# Patient Record
Sex: MALE | Race: WHITE | Employment: OTHER | URBAN - METROPOLITAN AREA
[De-identification: names, ages, dates, MRNs, and addresses within clinical notes are randomized per-mention and may not be internally consistent; named-entity substitution may affect disease eponyms.]

---

## 2018-08-21 ENCOUNTER — HOSPITAL ENCOUNTER (OUTPATIENT)
Dept: ULTRASOUND IMAGING | Facility: HOSPITAL | Age: 73
Discharge: HOME | End: 2018-08-21
Attending: FAMILY MEDICINE
Payer: MEDICARE

## 2018-08-21 DIAGNOSIS — I10 HYPERTENSION: ICD-10-CM

## 2018-08-21 DIAGNOSIS — E78.5 HYPERLIPEMIA: ICD-10-CM

## 2018-08-21 DIAGNOSIS — R42 DIZZINESS: ICD-10-CM

## 2018-08-21 PROCEDURE — 93880 EXTRACRANIAL BILAT STUDY: CPT | Performed by: FAMILY MEDICINE

## 2022-08-18 ENCOUNTER — TELEPHONE (OUTPATIENT)
Dept: ORTHOPEDICS CLINIC | Facility: CLINIC | Age: 77
End: 2022-08-18

## 2022-08-18 DIAGNOSIS — M79.672 LEFT FOOT PAIN: Primary | ICD-10-CM

## 2022-08-18 NOTE — TELEPHONE ENCOUNTER
Future Appointments   Date Time Provider iMkel Swanson   9/14/2022 10:40 AM Merary Olmedo MD [facility]     This patient is coming for LT Foot Pain. No prior imaging done yet. Please advise if views are needed for this appt. Thanks.     Patient can be reached at [phone]

## 2022-09-14 ENCOUNTER — HOSPITAL ENCOUNTER (OUTPATIENT)
Dept: GENERAL RADIOLOGY | Age: 77
Discharge: HOME | End: 2022-09-14
Attending: ORTHOPAEDIC SURGERY
Payer: MEDICARE

## 2022-09-14 ENCOUNTER — OFFICE VISIT (OUTPATIENT)
Dept: ORTHOPEDICS CLINIC | Facility: CLINIC | Age: 77
End: 2022-09-14
Payer: MEDICARE

## 2022-09-14 VITALS — WEIGHT: 214 LBS | HEIGHT: 68 IN | BODY MASS INDEX: 32.43 KG/M2

## 2022-09-14 DIAGNOSIS — M79.672 LEFT FOOT PAIN: ICD-10-CM

## 2022-09-14 DIAGNOSIS — G57.62 MORTON'S NEUROMA OF LEFT FOOT: Primary | ICD-10-CM

## 2022-09-14 PROCEDURE — 20610 DRAIN/INJ JOINT/BURSA W/O US: CPT | Performed by: ORTHOPAEDIC SURGERY

## 2022-09-14 PROCEDURE — 73630 X-RAY EXAM OF FOOT: CPT | Performed by: ORTHOPAEDIC SURGERY

## 2022-09-14 PROCEDURE — 99213 OFFICE O/P EST LOW 20 MIN: CPT | Performed by: ORTHOPAEDIC SURGERY

## 2022-09-14 RX ADMIN — TRIAMCINOLONE ACETONIDE 20 MG: 40 INJECTION, SUSPENSION INTRA-ARTICULAR; INTRAMUSCULAR at 15:22:00

## 2022-09-16 RX ORDER — TRIAMCINOLONE ACETONIDE 40 MG/ML
20 INJECTION, SUSPENSION INTRA-ARTICULAR; INTRAMUSCULAR ONCE
Status: COMPLETED | OUTPATIENT
Start: 2022-09-16 | End: 2022-09-14

## 2022-09-16 NOTE — PROGRESS NOTES
Patient is a 69-year-old white male here for follow-up or evaluation of his left foot. Patient's been having a problem for a few months now. He has pain in the primarily the forefoot but also some pain in the hindfoot as well. No particular trauma noted. Patient states that the pain seems to be in the midfoot region. Patient's exam shows him to have tenderness in the third webspace of the left foot. Mild swelling in this area. Minimal tenderness over the metatarsal phalangeal joint region. No palpable deformities. Minimal swelling in the hindfoot. Mild diffuse tenderness around the ankle joint itself. X-rays of the left foot basically showed degenerative changes of the first metatarsal phalangeal joint. The remaining joints have fairly healthy spaces present with minimal degenerative changes. Impression is that of Moraes's neuroma of the left foot. Recommendation is to go ahead with an injection which was done through a dorsal approach in the third webspace between the third and fourth metatarsal heads with 1 cc of Xylocaine and 20 mg of Kenalog. Patient tolerated the injection well. Patient was advised to follow-up with us in a month's time if he is not having relief of his pain. Might consider repeat x-rays to see if there is evidence for a stress fracture. If pain is sufficient and not improved might consider an MRI scan of the foot.   One alternative to the MRI scan might be a course of physical therapy for a few weeks prior to the test.